# Patient Record
Sex: FEMALE | Race: WHITE | NOT HISPANIC OR LATINO | Employment: FULL TIME | URBAN - METROPOLITAN AREA
[De-identification: names, ages, dates, MRNs, and addresses within clinical notes are randomized per-mention and may not be internally consistent; named-entity substitution may affect disease eponyms.]

---

## 2017-04-04 ENCOUNTER — COMMUNICATION - HEALTHEAST (OUTPATIENT)
Dept: FAMILY MEDICINE | Facility: CLINIC | Age: 57
End: 2017-04-04

## 2017-04-11 ENCOUNTER — OFFICE VISIT - HEALTHEAST (OUTPATIENT)
Dept: FAMILY MEDICINE | Facility: CLINIC | Age: 57
End: 2017-04-11

## 2017-04-11 DIAGNOSIS — F41.1 GENERALIZED ANXIETY DISORDER: ICD-10-CM

## 2017-04-11 DIAGNOSIS — Z11.1 SCREENING-PULMONARY TB: ICD-10-CM

## 2017-04-13 ENCOUNTER — AMBULATORY - HEALTHEAST (OUTPATIENT)
Dept: NURSING | Facility: CLINIC | Age: 57
End: 2017-04-13

## 2017-04-13 DIAGNOSIS — Z00.00 HEALTH CARE MAINTENANCE: ICD-10-CM

## 2017-05-19 ENCOUNTER — COMMUNICATION - HEALTHEAST (OUTPATIENT)
Dept: FAMILY MEDICINE | Facility: CLINIC | Age: 57
End: 2017-05-19

## 2017-05-19 DIAGNOSIS — F41.1 GENERALIZED ANXIETY DISORDER: ICD-10-CM

## 2017-06-12 ENCOUNTER — OFFICE VISIT - HEALTHEAST (OUTPATIENT)
Dept: FAMILY MEDICINE | Facility: CLINIC | Age: 57
End: 2017-06-12

## 2017-06-12 DIAGNOSIS — J02.9 SORE THROAT: ICD-10-CM

## 2017-06-12 DIAGNOSIS — J02.0 STREP PHARYNGITIS: ICD-10-CM

## 2017-06-12 DIAGNOSIS — R05.9 COUGH: ICD-10-CM

## 2017-06-13 ENCOUNTER — COMMUNICATION - HEALTHEAST (OUTPATIENT)
Dept: SCHEDULING | Facility: CLINIC | Age: 57
End: 2017-06-13

## 2017-06-13 ENCOUNTER — RECORDS - HEALTHEAST (OUTPATIENT)
Dept: ADMINISTRATIVE | Facility: OTHER | Age: 57
End: 2017-06-13

## 2017-07-21 ENCOUNTER — COMMUNICATION - HEALTHEAST (OUTPATIENT)
Dept: FAMILY MEDICINE | Facility: CLINIC | Age: 57
End: 2017-07-21

## 2017-07-21 DIAGNOSIS — F41.1 GENERALIZED ANXIETY DISORDER: ICD-10-CM

## 2017-12-04 ENCOUNTER — COMMUNICATION - HEALTHEAST (OUTPATIENT)
Dept: FAMILY MEDICINE | Facility: CLINIC | Age: 57
End: 2017-12-04

## 2017-12-04 DIAGNOSIS — F41.1 GENERALIZED ANXIETY DISORDER: ICD-10-CM

## 2018-01-30 ENCOUNTER — COMMUNICATION - HEALTHEAST (OUTPATIENT)
Dept: FAMILY MEDICINE | Facility: CLINIC | Age: 58
End: 2018-01-30

## 2018-01-30 DIAGNOSIS — F41.1 GENERALIZED ANXIETY DISORDER: ICD-10-CM

## 2018-01-30 DIAGNOSIS — Z11.1 SCREENING-PULMONARY TB: ICD-10-CM

## 2018-01-31 ENCOUNTER — AMBULATORY - HEALTHEAST (OUTPATIENT)
Dept: NURSING | Facility: CLINIC | Age: 58
End: 2018-01-31

## 2018-02-02 ENCOUNTER — AMBULATORY - HEALTHEAST (OUTPATIENT)
Dept: NURSING | Facility: CLINIC | Age: 58
End: 2018-02-02

## 2018-02-02 DIAGNOSIS — Z11.1 ENCOUNTER FOR PPD SKIN TEST READING: ICD-10-CM

## 2018-02-02 LAB
INDURATION - HISTORICAL: 0 MM
TB SKIN TEST - HISTORICAL: NEGATIVE

## 2018-02-07 ENCOUNTER — COMMUNICATION - HEALTHEAST (OUTPATIENT)
Dept: FAMILY MEDICINE | Facility: CLINIC | Age: 58
End: 2018-02-07

## 2018-03-20 ENCOUNTER — COMMUNICATION - HEALTHEAST (OUTPATIENT)
Dept: FAMILY MEDICINE | Facility: CLINIC | Age: 58
End: 2018-03-20

## 2018-03-20 DIAGNOSIS — F41.1 GENERALIZED ANXIETY DISORDER: ICD-10-CM

## 2018-05-15 ENCOUNTER — COMMUNICATION - HEALTHEAST (OUTPATIENT)
Dept: FAMILY MEDICINE | Facility: CLINIC | Age: 58
End: 2018-05-15

## 2018-05-15 DIAGNOSIS — F41.1 GENERALIZED ANXIETY DISORDER: ICD-10-CM

## 2018-05-16 ENCOUNTER — COMMUNICATION - HEALTHEAST (OUTPATIENT)
Dept: FAMILY MEDICINE | Facility: CLINIC | Age: 58
End: 2018-05-16

## 2018-05-16 DIAGNOSIS — F41.1 GENERALIZED ANXIETY DISORDER: ICD-10-CM

## 2018-06-05 ENCOUNTER — OFFICE VISIT - HEALTHEAST (OUTPATIENT)
Dept: FAMILY MEDICINE | Facility: CLINIC | Age: 58
End: 2018-06-05

## 2018-06-05 DIAGNOSIS — Z79.899 CONTROLLED SUBSTANCE AGREEMENT SIGNED: ICD-10-CM

## 2018-06-05 DIAGNOSIS — F41.9 ANXIETY: ICD-10-CM

## 2018-06-05 DIAGNOSIS — Z12.11 COLON CANCER SCREENING: ICD-10-CM

## 2018-07-04 ENCOUNTER — RECORDS - HEALTHEAST (OUTPATIENT)
Dept: ADMINISTRATIVE | Facility: OTHER | Age: 58
End: 2018-07-04

## 2020-01-06 ENCOUNTER — OFFICE VISIT - HEALTHEAST (OUTPATIENT)
Dept: FAMILY MEDICINE | Facility: CLINIC | Age: 60
End: 2020-01-06

## 2020-01-06 DIAGNOSIS — J01.80 OTHER SUBACUTE SINUSITIS: ICD-10-CM

## 2020-01-06 DIAGNOSIS — Z12.31 VISIT FOR SCREENING MAMMOGRAM: ICD-10-CM

## 2020-01-06 DIAGNOSIS — H93.8X3 EAR PRESSURE, BILATERAL: ICD-10-CM

## 2020-01-06 DIAGNOSIS — R42 DIZZINESS: ICD-10-CM

## 2020-01-06 DIAGNOSIS — Z12.11 SPECIAL SCREENING FOR MALIGNANT NEOPLASMS, COLON: ICD-10-CM

## 2020-01-08 ENCOUNTER — COMMUNICATION - HEALTHEAST (OUTPATIENT)
Dept: SCHEDULING | Facility: CLINIC | Age: 60
End: 2020-01-08

## 2020-01-22 ENCOUNTER — COMMUNICATION - HEALTHEAST (OUTPATIENT)
Dept: FAMILY MEDICINE | Facility: CLINIC | Age: 60
End: 2020-01-22

## 2020-01-31 ENCOUNTER — RECORDS - HEALTHEAST (OUTPATIENT)
Dept: ADMINISTRATIVE | Facility: OTHER | Age: 60
End: 2020-01-31

## 2020-01-31 LAB — COLOGUARD-ABSTRACT: NEGATIVE

## 2020-02-07 ENCOUNTER — OFFICE VISIT - HEALTHEAST (OUTPATIENT)
Dept: FAMILY MEDICINE | Facility: CLINIC | Age: 60
End: 2020-02-07

## 2020-02-07 DIAGNOSIS — H69.93 DYSFUNCTION OF BOTH EUSTACHIAN TUBES: ICD-10-CM

## 2020-02-13 ENCOUNTER — COMMUNICATION - HEALTHEAST (OUTPATIENT)
Dept: FAMILY MEDICINE | Facility: CLINIC | Age: 60
End: 2020-02-13

## 2020-02-14 ENCOUNTER — RECORDS - HEALTHEAST (OUTPATIENT)
Dept: HEALTH INFORMATION MANAGEMENT | Facility: CLINIC | Age: 60
End: 2020-02-14

## 2020-03-10 ENCOUNTER — COMMUNICATION - HEALTHEAST (OUTPATIENT)
Dept: FAMILY MEDICINE | Facility: CLINIC | Age: 60
End: 2020-03-10

## 2020-03-10 DIAGNOSIS — H93.13 TINNITUS, BILATERAL: ICD-10-CM

## 2020-03-10 DIAGNOSIS — H69.93 DYSFUNCTION OF BOTH EUSTACHIAN TUBES: ICD-10-CM

## 2020-05-04 ENCOUNTER — OFFICE VISIT - HEALTHEAST (OUTPATIENT)
Dept: FAMILY MEDICINE | Facility: CLINIC | Age: 60
End: 2020-05-04

## 2020-05-04 DIAGNOSIS — H65.23 BILATERAL CHRONIC SEROUS OTITIS MEDIA: ICD-10-CM

## 2020-05-04 RX ORDER — CETIRIZINE HYDROCHLORIDE 10 MG/1
10 TABLET ORAL DAILY
Status: SHIPPED | COMMUNITY
Start: 2020-05-04

## 2020-05-04 RX ORDER — PSEUDOEPHEDRINE HCL 30 MG
30 TABLET ORAL 2 TIMES DAILY
Status: SHIPPED | COMMUNITY
Start: 2020-05-04

## 2020-08-17 ENCOUNTER — OFFICE VISIT - HEALTHEAST (OUTPATIENT)
Dept: FAMILY MEDICINE | Facility: CLINIC | Age: 60
End: 2020-08-17

## 2020-08-17 ENCOUNTER — COMMUNICATION - HEALTHEAST (OUTPATIENT)
Dept: SCHEDULING | Facility: CLINIC | Age: 60
End: 2020-08-17

## 2020-08-17 DIAGNOSIS — F41.1 GENERALIZED ANXIETY DISORDER: ICD-10-CM

## 2020-08-17 RX ORDER — HYDROXYZINE PAMOATE 25 MG/1
25 CAPSULE ORAL 3 TIMES DAILY PRN
Qty: 30 CAPSULE | Refills: 0 | Status: SHIPPED | OUTPATIENT
Start: 2020-08-17

## 2020-08-17 ASSESSMENT — ANXIETY QUESTIONNAIRES
IF YOU CHECKED OFF ANY PROBLEMS ON THIS QUESTIONNAIRE, HOW DIFFICULT HAVE THESE PROBLEMS MADE IT FOR YOU TO DO YOUR WORK, TAKE CARE OF THINGS AT HOME, OR GET ALONG WITH OTHER PEOPLE: SOMEWHAT DIFFICULT
GAD7 TOTAL SCORE: 11
5. BEING SO RESTLESS THAT IT IS HARD TO SIT STILL: SEVERAL DAYS
6. BECOMING EASILY ANNOYED OR IRRITABLE: NOT AT ALL
7. FEELING AFRAID AS IF SOMETHING AWFUL MIGHT HAPPEN: NEARLY EVERY DAY
2. NOT BEING ABLE TO STOP OR CONTROL WORRYING: SEVERAL DAYS
3. WORRYING TOO MUCH ABOUT DIFFERENT THINGS: MORE THAN HALF THE DAYS
4. TROUBLE RELAXING: MORE THAN HALF THE DAYS
1. FEELING NERVOUS, ANXIOUS, OR ON EDGE: MORE THAN HALF THE DAYS

## 2020-08-17 ASSESSMENT — PATIENT HEALTH QUESTIONNAIRE - PHQ9: SUM OF ALL RESPONSES TO PHQ QUESTIONS 1-9: 3

## 2020-10-07 ENCOUNTER — COMMUNICATION - HEALTHEAST (OUTPATIENT)
Dept: SCHEDULING | Facility: CLINIC | Age: 60
End: 2020-10-07

## 2020-10-07 ENCOUNTER — OFFICE VISIT - HEALTHEAST (OUTPATIENT)
Dept: FAMILY MEDICINE | Facility: CLINIC | Age: 60
End: 2020-10-07

## 2020-10-07 DIAGNOSIS — J01.90 ACUTE NON-RECURRENT SINUSITIS, UNSPECIFIED LOCATION: ICD-10-CM

## 2020-10-07 DIAGNOSIS — Z20.822 SUSPECTED COVID-19 VIRUS INFECTION: ICD-10-CM

## 2020-11-04 ENCOUNTER — AMBULATORY - HEALTHEAST (OUTPATIENT)
Dept: FAMILY MEDICINE | Facility: CLINIC | Age: 60
End: 2020-11-04

## 2020-11-04 DIAGNOSIS — Z20.822 SUSPECTED COVID-19 VIRUS INFECTION: ICD-10-CM

## 2020-11-06 ENCOUNTER — COMMUNICATION - HEALTHEAST (OUTPATIENT)
Dept: SCHEDULING | Facility: CLINIC | Age: 60
End: 2020-11-06

## 2021-05-27 ENCOUNTER — RECORDS - HEALTHEAST (OUTPATIENT)
Dept: ADMINISTRATIVE | Facility: CLINIC | Age: 61
End: 2021-05-27

## 2021-05-27 ASSESSMENT — PATIENT HEALTH QUESTIONNAIRE - PHQ9: SUM OF ALL RESPONSES TO PHQ QUESTIONS 1-9: 3

## 2021-05-28 ASSESSMENT — ANXIETY QUESTIONNAIRES: GAD7 TOTAL SCORE: 11

## 2021-06-01 VITALS — HEIGHT: 65 IN

## 2021-06-04 VITALS
HEART RATE: 74 BPM | RESPIRATION RATE: 16 BRPM | DIASTOLIC BLOOD PRESSURE: 92 MMHG | SYSTOLIC BLOOD PRESSURE: 144 MMHG | OXYGEN SATURATION: 100 % | TEMPERATURE: 98.6 F

## 2021-06-04 VITALS
SYSTOLIC BLOOD PRESSURE: 120 MMHG | HEART RATE: 76 BPM | TEMPERATURE: 98.7 F | OXYGEN SATURATION: 99 % | DIASTOLIC BLOOD PRESSURE: 84 MMHG

## 2021-06-05 NOTE — PROGRESS NOTES
Assessment:     1. Other subacute sinusitis  fluticasone propionate (FLONASE) 50 mcg/actuation nasal spray    loratadine (CLARITIN) 10 mg tablet    amoxicillin (AMOXIL) 500 MG tablet   2. Visit for screening mammogram  Mammo Screening Bilateral   3. Special screening for malignant neoplasms, colon  Cologuard   4. Dizziness     5. Ear pressure, bilateral       Symptoms with sinusitis and eustachian tube dysfunction.  Discussed with patient that these need to be managed and at this time antibiotics are not really needed but if she continues to worsen then she can start taking it.    I have reviewed that she has not had routine health maintenance for some time.  Reviewed her chart from [facility] and in the past she is also had some prediabetes with a hemoglobin A1c of 6.0.  This was in 2013.  Unable to find a recent Pap.  Last one is from 2013.  Advised patient to RTC for lab work and health maintenance.    Offered testing baseline CBC and hemoglobin A1c today that she declines.    Plan:      The diagnosis was discussed with the patient and evaluation and treatment plans outlined.  Follow up: Return for Annual physical.     Subjective:      Elijah Jorgensen is a  female who presents for evaluation of   Chief Complaint   Patient presents with     Dizziness     Pt here today to evaluate dizziness, bilateral ear pain/ fullness/ ringing, nausea, HA's, x 1 mo      Onset was 1 month ago. Symptoms have been recurring. The pain is described as pressure-like, and is Mild to moderate/10 in intensity. Pain is located in the both ears with radiation to none.  Aggravating factors: none.  Alleviating factors: Sudafed. Associated symptoms: headache and nasal and sinus congestion. The patient denies anorexia, chills, fever, nausea and vomiting.  Patient reports that her symptoms started about 1 month ago.  She was seen in an emergency room.  She was told to start symptom measures.  Sudafed helped.  She thought she was  getting better and then discontinued Sudafed and feels like she had a rebound of the symptoms.    The following portions of the patient's history were reviewed and updated as appropriate: allergies, current medications, past family history, past medical history, past social history, past surgical history and problem list.  Allergies   Allergen Reactions     Erythromycin Base      Sulfa (Sulfonamide Antibiotics)        Current Outpatient Medications on File Prior to Visit   Medication Sig Dispense Refill     HEMP OIL OR EXTRACT OR OTHER CBD CANNABINOID, NOT MEDICAL CANNABIS, CBD oil- takes prn       [DISCONTINUED] ALPRAZolam (XANAX) 0.25 MG tablet Take 1 tablet (0.25 mg total) by mouth 3 (three) times a day as needed for anxiety. 30 tablet 0     [DISCONTINUED] sertraline (ZOLOFT) 25 MG tablet Take 1 tablet (25 mg total) by mouth daily. 30 tablet 2     No current facility-administered medications on file prior to visit.        Patient Active Problem List   Diagnosis     Generalized Anxiety Disorder       History reviewed. No pertinent past medical history.    Past Surgical History:   Procedure Laterality Date     [place] REPAIR UMBILICAL CHRIS,<4Y/O,REDUC      Description: Umbilical Hernia Repair;  Recorded: 06/19/2013;       History reviewed. No pertinent family history.    Social History     Socioeconomic History     Marital status:      Spouse name: None     Number of children: None     Years of education: None     Highest education level: None   Occupational History     None   Social Needs     Financial resource strain: None     Food insecurity:     Worry: None     Inability: None     Transportation needs:     Medical: None     Non-medical: None   Tobacco Use     Smoking status: Never Smoker     Smokeless tobacco: Never Used   Substance and Sexual Activity     Alcohol use: None     Drug use: None     Sexual activity: None   Lifestyle     Physical activity:     Days per week: None     Minutes per session: None      Stress: None   Relationships     Social connections:     Talks on phone: None     Gets together: None     Attends Christian service: None     Active member of club or organization: None     Attends meetings of clubs or organizations: None     Relationship status: None     Intimate partner violence:     Fear of current or ex partner: None     Emotionally abused: None     Physically abused: None     Forced sexual activity: None   Other Topics Concern     None   Social History Narrative    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        Enrique Tipton MD  1/6/2020           Review of Systems  Constitutional: negative  Cardiovascular: negative  Gastrointestinal: negative  Hematologic/lymphatic: negative       Objective:     BP (!) 144/92 (Patient Site: Left Arm, Patient Position: Sitting, Cuff Size: Adult Large)   Pulse 74   Temp 98.6  F (37  C) (Oral)   Resp 16   SpO2 100%       General:Healthy, alert and in no acute distress  Head:  NCAT w/o lesions or tenderness  Eyes: conjunctivae/corneas clear. PERRL, EOM's intact. Fundi benign  Ears: normal TM's and external ear canals bilateral  Sinus tender: negative  Nose: Enlarged and erythematous turbinates  Mouth: lips, mucosa, and tongue normal. Teeth and gums normal. No tonsillar endangerment,  Mild erythema of pharynx  Neck: supple, symmetrical, trachea midline.  Lungs: clear to auscultation bilaterally  Heart: RRR, No murmurs  Abdomen: Soft NTND, No HSM  Skin: No rashes  Neurological exam: gait normal, alert and oriented X 3, reflexes active and equal, R handed, speech normal, mental status intact, muscle tone normal, muscle strength normal,  Bronson-Hallpike test is normal/negative.

## 2021-06-05 NOTE — PATIENT INSTRUCTIONS - HE
On exam you have clear fluid behind the ear drum - no infection - we call this eustachian tube dysfunction  To treat this:  -start daily flonase for at least 4 weeks  -start daily antihistamine - zyrtec (start with this, take in the morning once per day) or allegra (2nd option)    If not improving after doing this for 4 weeks or if worsening then I would consider referral to the ENT (ear specialist)    [facility] [phone]

## 2021-06-05 NOTE — PATIENT INSTRUCTIONS - HE
Patient Education   Patient Education     Understanding Acute Rhinosinusitis    Acute rhinosinusitis is when the lining of the inside of the nose and the sinuses becomes irritated and swollen. It is also called sinusitis, or a sinus infection.  Sinuses are air-filled spaces in the skull behind the face. They are kept moist and clean by a lining of mucosa. Things such as pollen, smoke, and chemical fumes can irritate the mucosa. It can then swell up. As a response to irritation, the mucosa makes more mucus and other fluids. Tiny hairlike cilia cover the mucosa. Cilia help carry mucus toward the opening of the sinus. Too much mucus may cause the cilia to stop working. This blocks the sinus opening. A buildup of fluid in the sinuses then causes pain and pressure. It can also cause bacteria to grow in the sinuses.  What causes acute rhinosinusitis?  A sinus infection is most often caused by a virus. You are more likely to get one after having a cold or the flu. In some cases, a sinus infection can be caused by bacteria.  You are at higher risk for a sinus infection if you:    Are older in age    Have structural problems with your sinuses    Smoke or are exposed to secondhand smoke    Are exposed to changes in pressure, such as from flying a lot or deep sea diving    Have asthma or allergies    Have a weak immune system    Have dental disease     Symptoms of acute rhinosinusitis  Symptoms of acute rhinosinusitis often last around 7 to 10 days. If you have a bacterial infection, they may last longer. They may also get better but then worsen. You may have:    Face pain or pressure under the eyes and around the nose    Headache    Fluid draining in the back of the throat (postnasal drip)    Congestion    Drainage that is thick and colored (often green), instead of clear    Cough    Problems with your sense of smell    Ear pain or hearing problems    Fever    Tooth pain    Fatigue  Diagnosing acute  rhinosinusitis  Your healthcare provider will ask about your symptoms and past health. He or she will look at your ears, nose, throat, and sinuses. Imaging tests, such as X-rays, are often not needed.  It can be hard to figure out if a sinus infection is caused by a virus or bacterium. A bacterial infection tends to last longer. Symptoms may also get better but then worsen. Your healthcare provider may take a sample of mucus from your nose to check for bacteria.  Treating acute rhinosinusitis  Most sinus infections will go away within 10 days. Your body will fight off the virus. If your symptoms seem to get better but then worsen, you may have a bacterial infection instead. Your healthcare provider will then give you antibiotics. Take this medicine until it is gone, even if you feel better.  To help ease your symptoms, your healthcare provider may advise:    Over-the-counter pain relievers. Medicines such as acetaminophen or ibuprofen can ease sinus pain. They may also lower a fever.    Nasal washes. Washing your nasal passages with salt water may ease pain and pressure. It can rinse out mucous and other irritants from your sinuses. Your healthcare provider can show you how to do it.    Nasal steroid spray. This prescription medicine can reduce inflammation in your sinuses.    Other medicines. Decongestants, antihistamines, and other nasal sprays may give short-term relief. They may help with congestion. Talk with your healthcare provider before taking these medicines.     Preventing acute rhinosinusitis  You can help prevent a sinus infection with these steps:    Wash your hands well and often.    Stay away from people who have a cold or upper respiratory infection.    Don't smoke. And stay away from secondhand smoke.    Use a humidifier at home.    Make sure you are up-to-date on your vaccines, such as the flu shot.     When to call your healthcare provider  Call your healthcare provider right away if you have any  of these:    Fever of 100.4 F (38 C) or higher, or as directed by your healthcare provider    Pain that gets worse    Symptoms that don t get better, or get worse    New symptoms  Date Last Reviewed: 6/1/2019 2000-2019 [employer]. [address] [place] [id number]. All rights reserved. This information is not intended as a substitute for professional medical care. Always follow your healthcare professional's instructions.           Causes of Sinusitis  Mucus helps keep your sinuses clean. But mucus may build up in the sinuses because of colds, allergies, or blockages. These things get in the way of the natural drainage of mucus. This may lead to sinusitis. Sinusitis means sinus inflammation and infection.    Acute sinusitis comes on suddenly. It often happens right after an upper respiratory infection, such as a cold. Viruses cause most acute sinus infections.    Chronic sinusitis is ongoing swelling of the sinus lining. Doctors don't know what causes chronic sinusitis.      Colds and other infections  A cold or flu may cause your sinus and nasal linings to swell. Sinus openings can become blocked. This causes mucus to back up. This backed-up mucus becomes an ideal place for bacteria to grow. Thick, yellow, or discolored mucus is one sign of infection.  Allergic reactions  You may be sensitive to certain substances. This causes the release of histamine in the body. Histamine makes your sinus and nasal linings swell. Long-term swelling clogs your sinuses. It prevents the tiny hairs (cilia) in the nasal lining from sweeping away mucus. Allergy symptoms can continue over time. But they re less severe than with colds.  Blockages    A polyp is a sac of swollen tissue. It can be the result of an allergy or infection. It may block the opening where most of your sinuses drain (middle meatus). It may even grow large enough to block your nose.    A deviated septum is when the thin wall inside your  nose is pushed to one side. It's often the result of injury. This can block your middle meatus.  People with chronic nasal problems or allergies are more likely to get acute sinusitis. Sinusitis is also more common if you have a weakened immune system, such as with HIV. You are also more likely to get sinusitis if you have cystic fibrosis or another condition that causes your body to make extra mucus.  Date Last Reviewed: 7/1/2019 2000-2019 [employer]. [address] [place] [address], [place] [address]. All rights reserved. This information is not intended as a substitute for professional medical care. Always follow your healthcare professional's instructions.

## 2021-06-05 NOTE — TELEPHONE ENCOUNTER
RN Triage:    Seen in clinic 2 d ago for congestion in ears.  Was diagnosed with sinusitis and prescribed flonase and claritin.  Was also prescribed an antibiotic to begin if symptoms worsened.  No sinus pain, pressure or headache.  No nasal discharge.  Has been taking zyrtec instead of claritin.  Has not started taking the antibiotic.    Calling today because she began feeling more fatigued yesterday and has had one episode of diarrhea today.  Has not taken her temperature.    Home care for diarrhea discussed.  She plans to continue to hold off on the antibiotic for now, since her sinus symptoms are not worse.  Will call back if symptoms persist or worsen.    Marina Patterson, RN  [facility]    Reason for Disposition    MILD-MODERATE diarrhea (e.g., 1-6 times / day more than normal)    Protocols used: DIARRHEA-A-OH

## 2021-06-05 NOTE — PROGRESS NOTES
"Assessment/Plan:    1. Dysfunction of both eustachian tubes  Evidence of eustachian tube dysfunction on exam which is likely cause of symptoms - no evidence of infection or serious pathology on exam. Recommend treatment with the following: start daily flonase, start daily antihistamine zyrtec or allegra. Discussed if symptoms do not resolve after 4 weeks could consider referral to ENT for further evaluation and management. Pt agrees with plan.      Follow up: 4 weeks for recheck if needed    Melinda Chang MD  [facility]    Subjective:    Patient ID: Elijah Jorgensen is a 59 y.o. female is here today for ear concerns    Ear concerns  -seen on 12/6/19 at [facility] ER/UR - dx with eustachian tube dysfunction on left and URI - recommended to take sudafed and nasal spray  -symptoms worsened/weren't improving so went back for recheck  -seen on 1/6 at another clinic - diagnosed with sinus infection and eustachian tube dysfunc (flonase, amox, claritin)  -hears a \"hissing\" sound in her left ear  -like \"when you are on an airplane and it feels like sounds are switching\"  -goes to chiropractor  -symptoms worse when exerting self   -wants to make sure ears are looking ok  -no fevers  -some nasal congestion - states probably has allergies  -stopped flonase - took zyrtec instead but not taking any longer  -took amoxicillin 8/10 days - missed 2 days  -reports hx sinus infections  -hard to say if hearing is worse than normal  -no further vertigo/dizziness  -roommate/fiance has been sick with similar sx but now better      Patient Active Problem List   Diagnosis     Generalized Anxiety Disorder     Past Surgical History:   Procedure Laterality Date     [place] REPAIR UMBILICAL CHRIS,<4Y/O,REDUC      Description: Umbilical Hernia Repair;  Recorded: 06/19/2013;     Current Outpatient Medications on File Prior to Visit   Medication Sig Dispense Refill     fluticasone propionate (FLONASE) 50 mcg/actuation nasal spray 2 sprays " into each nostril daily. 16 g 12     HEMP OIL OR EXTRACT OR OTHER CBD CANNABINOID, NOT MEDICAL CANNABIS, CBD oil- takes prn       loratadine (CLARITIN) 10 mg tablet Take 1 tablet (10 mg total) by mouth daily. 30 tablet 2     No current facility-administered medications on file prior to visit.      Allergies   Allergen Reactions     Erythromycin Base      Sulfa (Sulfonamide Antibiotics)      Social History     Socioeconomic History     Marital status:      Spouse name: Not on file     Number of children: Not on file     Years of education: Not on file     Highest education level: Not on file   Occupational History     Not on file   Social Needs     Financial resource strain: Not on file     Food insecurity:     Worry: Not on file     Inability: Not on file     Transportation needs:     Medical: Not on file     Non-medical: Not on file   Tobacco Use     Smoking status: Never Smoker     Smokeless tobacco: Never Used   Substance and Sexual Activity     Alcohol use: Not on file     Drug use: Not on file     Sexual activity: Not on file   Lifestyle     Physical activity:     Days per week: Not on file     Minutes per session: Not on file     Stress: Not on file   Relationships     Social connections:     Talks on phone: Not on file     Gets together: Not on file     Attends Catholic service: Not on file     Active member of club or organization: Not on file     Attends meetings of clubs or organizations: Not on file     Relationship status: Not on file     Intimate partner violence:     Fear of current or ex partner: Not on file     Emotionally abused: Not on file     Physically abused: Not on file     Forced sexual activity: Not on file   Other Topics Concern     Not on file   Social History Narrative    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        Enrique Tipton MD  1/6/2020         History reviewed. No pertinent family  history.  Review of systems is as stated in HPI, and the remainder of system review is otherwise negative.    Objective:      /84   Pulse 76   Temp 98.7  F (37.1  C)   SpO2 99%     General appearance: awake, NAD  HEENT: atraumatic, normocephalic, PERRL, no scleral icterus or injection, bilateral eustachian tube dysfunction but no evidence of infection, no sinus tenderness, no significant rhinorrhea, no erythema of posterior oropharynx, moist mucous membranes  Neck: supple, no lymphadenopathy, normal ROM  CV: RRR, no murmurs/rubs/gallops, normal S1 and S2  Lungs: CTAB, no wheezes or crackles, breathing comfortably on room air  Extremities: no LE edema bilaterally, moving all extremities  Skin: no rashes or lesions  Neuro: alert, oriented x3, CNs grossly intact, no focal deficits appreciated  Psych: normal mood/affect/behavior, answering questions appropriately, linear thought process

## 2021-06-06 NOTE — TELEPHONE ENCOUNTER
Referral Request  Type of referral: ENT/ Audiology   Who s requesting: Patient  Why the request: Ear plugged, ringing and hissing sound x3 months   Have you been seen for this request: Yes  Does patient have a preference on a group/provider? No   Okay to leave a detailed message?  Yes

## 2021-06-06 NOTE — TELEPHONE ENCOUNTER
----- Message from Lizzy Zendejas MD sent at 2/13/2020 12:22 PM CST -----  Please contact patient with results:  Cologuard test is negative/normal  Lizzy Zendejas MD  Family Medicine  [facility]

## 2021-06-06 NOTE — TELEPHONE ENCOUNTER
Order for ENT pended for approval    Per 2/7/2020 OV note:   1. Dysfunction of both eustachian tubes  Evidence of eustachian tube dysfunction on exam which is likely cause of symptoms - no evidence of infection or serious pathology on exam. Recommend treatment with the following: start daily flonase, start daily antihistamine zyrtec or allegra. Discussed if symptoms do not resolve after 4 weeks could consider referral to ENT for further evaluation and management. Pt agrees with plan.        Follow up: 4 weeks for recheck if needed

## 2021-06-06 NOTE — TELEPHONE ENCOUNTER
Patient Returning Call  Reason for call:  Return call   Information relayed to patient:  Caller was informed of message below.  Patient has additional questions:  No  If YES, what are your questions/concerns:  n/a  Okay to leave a detailed message?: No call back needed

## 2021-06-06 NOTE — TELEPHONE ENCOUNTER
Left message to call back for: cologuard results  Information to relay to patient:  See note from provider below.

## 2021-06-07 NOTE — PROGRESS NOTES
"Elijah Jorgensen is a 59 y.o. female who is being evaluated via a billable video visit.      The patient has been notified of following:     \"This video visit will be conducted via a call between you and your physician/provider. We have found that certain health care needs can be provided without the need for an in-person physical exam.  This service lets us provide the care you need with a video conversation.  If a prescription is necessary we can send it directly to your pharmacy.  If lab work is needed we can place an order for that and you can then stop by our lab to have the test done at a later time.    Video visits are billed at different rates depending on your insurance coverage. Please reach out to your insurance provider with any questions.    If during the course of the call the physician/provider feels a video visit is not appropriate, you will not be charged for this service.\"    Patient has given verbal consent to a Video visit? Yes    Patient would like to receive their AVS by AVS Preference: Krysten.    Patient would like the video invitation sent by: Text to cell phone: [phone]    Will anyone else be joining your video visit? No        Video Start Time: 11:43 AM    Additional provider notes:     Assessment/Plan:      Bilateral chronic serous otitis media  Discussed at length with patient today options for this issue.  Since she has tried and failed a course of amoxicillin, nasal sprays, antihistamines and decongestants, discussed that we can try combination of oral steroids and antibiotics.  Patient is willing to try this.  She is very nervous about becoming jittery on steroids.  Discussed that she can cut back to 20 mg if she feels overly jittery on 40 mg.  She will take this in the morning with food.  If no improvement with antibiotics and steroids, she will let us know at which point if she is having severe symptoms we can reach out to ENT to see if they would see her for this.  - " "predniSONE (DELTASONE) 20 MG tablet; Take 40 mg by mouth daily for 5 days.  Dispense: 10 tablet; Refill: 0  - cefdinir (OMNICEF) 300 MG capsule; Take 1 capsule (300 mg total) by mouth 2 (two) times a day for 10 days.  Dispense: 20 capsule; Refill: 0        Subjective:       Elijah Jorgensen is a 59 y.o. female who presents for further discussion of bilateral ear plugging and intermittent pain.  Patient has had this issue since December in both ears.  She felt a bit better after a course of amoxicillin that she took in January, but symptoms fairly quickly returned thereafter.  She has been using Flonase and Claritin.  She also started taking Sudafed.  Initially she was taking this every 6 hours, then has decreased to twice daily.  She denies that Sudafed keeps her up at night.  She has been taking 60 mg twice daily.  She describes intermittent pain in either ear, sometimes worse on the right.  She also describes increasing nasal congestion over the past few days along with a \"tinny\" sound in both ears.  She will describe a hissing sound intermittently as well.  She feels mild pressure around her nose and possibly in her forehead.  She denies nasal drainage.  She also denies fevers.  She denies drainage from the ears.    The following portions of the patient's history were reviewed and updated as appropriate: allergies, current medications, past medical history, past social history and problem list.      Current Outpatient Medications:      cetirizine (ZYRTEC) 10 MG tablet, Take 10 mg by mouth daily., Disp: , Rfl:      HEMP OIL OR EXTRACT OR OTHER CBD CANNABINOID, NOT MEDICAL CANNABIS,, CBD oil- takes prn, Disp: , Rfl:      pseudoephedrine (SUDAFED) 30 MG tablet, Take 30 mg by mouth 2 (two) times a day., Disp: , Rfl:      fluticasone propionate (FLONASE) 50 mcg/actuation nasal spray, 2 sprays into each nostril daily., Disp: 16 g, Rfl: 12    Review of Systems   Pertinent items are noted in HPI.      Objective:      " Breastfeeding No     General appearance: alert, appears stated age and cooperative  Head: Normocephalic, without obvious abnormality, atraumatic  Eyes: Conjunctivae clear  Ears: External ears normal without swelling, no drainage  Nose: no discharge, mild congestion  Neurologic: Grossly normal, alert and oriented x3, good historian              Video-Visit Details    Type of service:  Video Visit    Video End Time (time video stopped): 12:02 PM  Originating Location (pt. Location): Home    Distant Location (provider location):  [facility]     Platform used for Video Visit: Kellie Zafar MD

## 2021-06-10 NOTE — PROGRESS NOTES
Assessment & Plan:  1. Generalized anxiety disorder  Restart xanax, use sparingly. Discussed possible need for CSA if needing on a regular basis. Patient in agreement with plan. Recommend follow up for mood every 6 months. Schedule annual exam in the coming months.  - ALPRAZolam (XANAX) 0.25 MG tablet; Take 1 tablet (0.25 mg total) by mouth 3 (three) times a day as needed for anxiety.  Dispense: 30 tablet; Refill: 0    2. Screening-pulmonary TB  Skin test placed by nursing, come back for read in 48-72 hours and we will provide documentation.   - TB Skin Test      There are no Patient Instructions on file for this visit.    Orders Placed This Encounter   Procedures     TB Skin Test     There are no discontinued medications.        The following are part of a depression follow up plan for the patient:  emotional support education    Chief Complaint:   Chief Complaint   Patient presents with     mantoux placement     Establish Care       History of Present Illness:  Elijah is a 56 y.o. female presenting to the clinic today for mental screening for a new job.  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  Secondly she is here to establish care, she is generally quite healthy individual with the exception of history of anxiety.  Her   about 3 years ago and she has been dealing with intermittent anxiety since that time.  She is wondering today if we can discuss medication use for this, she does not desire to take daily medication but has used Xanax quite sparingly in the past and this has helped her. She has used daily medication previously and did not like how it made her feel, she did not find it to be helpful enough to tolerate side effects.  She is not interested in therapy at this time.  She has tried in the past and does not wish to pursue at this time. She had a good support system with family and friends. She works as a hairdresser. She mentions  that use this time of years especially difficult given that the anniversary of death is coming up and it was also recently birthday.  She is hoping to have a few tablets of Xanax to help her with the situational problem.     Review of Systems:  All other systems are negative except as noted above.    PFSH:  Works as a hairdresser, single, currently working towards another job as a PCA.    Tobacco Use:  History   Smoking Status     Never Smoker   Smokeless Tobacco     Not on file       Vitals:  Vitals:    04/11/17 1126   BP: 120/84   Patient Site: Left Arm   Patient Position: Sitting   Cuff Size: Adult Regular   Pulse: 78   Resp: 16     Wt Readings from Last 3 Encounters:   No data found for Wt       Physical Exam:  Constitutional:  Reveals an alert, cooperative, 56 year old female in no acute distress.  Vitals:  Per nursing notes.  Cardiovascular:  Regular rate and rhythm without murmurs, rubs, or gallops. Carotids without bruits. Legs without edema.   Respiratory: Clear.  Respiratory effort normal.  Psychiatric:  Mood appropriate, memory intact.     Data Reviewed:  Additional History from Old Records or Another Person Summarized (2 total): None.     Decision to Obtain Extra information (1 total): None.     Radiology Tests Summarized and Ordered (XRAY/CT/MRI/DXA) (1 total): None.    Labs Reviewed and Ordered (1 total): None.    Medicine Tests Summarized and Ordered (EKG/ECHO/COLONOSCOPY/EGD) (1 total): None.    Independent Review of EKG or X-Ray (2 each): None.    The visit lasted a total of 45 minutes face to face with the patient. Over 50% of the time was spent counseling and educating the patient about plan of care.    Medications:  Current Outpatient Prescriptions   Medication Sig Dispense Refill     ALPRAZOLAM (XANAX ORAL) Take by mouth.       ALPRAZolam (XANAX) 0.25 MG tablet Take 1 tablet (0.25 mg total) by mouth 3 (three) times a day as needed for anxiety. 30 tablet 0     No current facility-administered  medications for this visit.        Total Data Points: 0    BLAINE Presley, CNP    This note has been dictated using voice recognition software. Any grammatical or context distortions are unintentional and inherent to the software

## 2021-06-10 NOTE — TELEPHONE ENCOUNTER
"Triage Call  Call from patient with concerns of anxiety and heart palpitations \"it feels like I have an extra heartbeat\"  Reports having increased stressors right now  Tearful at times on phone   Has had happen in the past when    Has been going on three days and states \"I think I just need a little help right now\"  Denies chest pain or pressure  Denies shortness of breath   Denies trouble sleeping but will wake up with fast heart rate  Feeling tense and shaky  Trouble concentrating  Denies having thoughts of hurting self  Reports going through a break-up and also an upcoming service for a friend who had passed a few months ago  \"I just think I need some help right now\"  Denies drinking caffeinated beverages  Denies drug use or alcohol  Denies feelings of sadness or hopelessness  Had prescription or Xanax in the past that was helpful \"I don't know if I need Xanax but something to help me get through this\" \"I don't like feeling weird but I need help\"     Disposition   See within 3 days in office per protocol. Patient prefers to do a virtual visit. Educated on care advice and discussed sleep. Exercise, eating a balanced diet and staying hydrated. Encouraged to call back if symptoms worsen or with questions. Transferred to scheduling.    Reason for Disposition    Patient wants to be seen    Additional Information    Negative: SEVERE difficulty breathing (e.g., struggling for each breath, speaks in single words)    Negative: Bluish (or gray) lips or face    Negative: Difficult to awaken or acting confused  (e.g., disoriented, slurred speech)    Negative: Hysterical or combative behavior    Negative: Sounds like a life-threatening emergency to the triager    Negative: Chest pain    Negative: Palpitations, skipped heart beat, or rapid heart beat    Negative: Cough is main symptom    Negative: Suicide thoughts, threats, attempts, or questions    Negative: Depression is main problem or symptom (e.g., feelings of " sadness or hopelessness)    Negative: Difficulty breathing and persists > 10 minutes and not relieved by reassurance provided by triager    Negative: Lightheadedness or dizziness and persists > 10 minutes and not relieved by reassurance provided by triager    Negative: Alcohol or drug abuse, known or suspected, and feeling very shaky (i.e., visible tremors of hands)    Negative: Patient sounds very sick or weak to the triager    Negative: Patient sounds very upset or troubled to the triager    Negative: Symptoms interfere with work or school    Negative: Symptoms of anxiety or panic and has not been evaluated for this by physician    Negative: Started on anti-anxiety medication and no relief    Negative: Significant weight loss (or gain) and not dieting    Negative: Taking thyroid medications    Negative: Caffeine abuse, known or suspected (e.g., > 2 cups of coffee/tea or > 4 cans of soda / day)    Negative: Alcohol or drug abuse, known or suspected    Negative: Taking herbal remedies    Negative: Recent traumatic event (e.g., death of a loved one, job loss, victim/witness of crime)    Negative: Requesting to talk to a counselor (e.g., mental health worker, psychiatrist)    Protocols used: ANXIETY AND PANIC ATTACK-A-OH    COVID 19 Nurse Triage Plan/Patient Instructions    Please be aware that novel coronavirus (COVID-19) may be circulating in the community. If you develop symptoms such as fever, cough, or SOB or if you have concerns about the presence of another infection including coronavirus (COVID-19), please contact your health care provider or visit [email or web address].     Disposition/Instructions    Virtual Visit with provider recommended. Reference Visit Selection Guide.    Thank you for taking steps to prevent the spread of this virus.  o Limit your contact with others.  o Wear a simple mask to cover your cough.  o Wash your hands well and often.    Resources    [facility]: About COVID-19:  [email or web address]    CDC: What to Do If You're Sick: www.cdc.gov/coronavirus/2019-ncov/about/steps-when-sick.html    CDC: Ending Home Isolation: www.cdc.gov/coronavirus/2019-ncov/hcp/disposition-in-home-patients.html     CDC: Caring for Someone: www.cdc.gov/coronavirus/2019-ncov/if-you-are-sick/care-for-someone.html     [facility]: Interim Guidance for Hospital Discharge to Home: www.[email or web address]./diseases/coronavirus/hcp/hospdischarge.pdf    [facility] clinical trials (COVID-19 research studies): [email or web address]-clinical-trials     Below are the COVID-19 hotlines at the [place] Department [facility] ([facility]). Interpreters are available.   o For health questions: Call 090-023-4518 or 1-868.336.1042 (7 a.m. to 7 p.m.)  o For questions about schools and childcare: Call 524-401-4817 or 1-282.745.4161 (7 a.m. to 7 p.m.)     Lizzy Joe RN [facility] 8/17/2020 2:58 PM

## 2021-06-10 NOTE — PROGRESS NOTES
"Elijah Jorgensen is a 59 y.o. female who is being evaluated via a billable telephone visit.      The patient has been notified of following:     \"This telephone visit will be conducted via a call between you and your physician/provider. We have found that certain health care needs can be provided without the need for a physical exam.  This service lets us provide the care you need with a short phone conversation.  If a prescription is necessary we can send it directly to your pharmacy.  If lab work is needed we can place an order for that and you can then stop by our lab to have the test done at a later time.    Telephone visits are billed at different rates depending on your insurance coverage. During this emergency period, for some insurers they may be billed the same as an in-person visit.  Please reach out to your insurance provider with any questions.    If during the course of the call the physician/provider feels a telephone visit is not appropriate, you will not be charged for this service.\"    Patient has given verbal consent to a Telephone visit? Yes    What phone number would you like to be contacted at? [phone]    Patient would like to receive their AVS by AVS Preference: E-Mail (Inform patient AVS not encrypted with this option).        HPI - 58 yo female   Since Friday having anxiety and getting worse over the last 3-4 days  Feeling shaky  Heart racing like extra heart beat  Had similar in the past (years ago) when   and took Xanax briefly   Boyfriend is moving out  Friend  5 months ago and services is coming up  Feeling very nervous  Trying to eat well, get sleep, drink water but its not helping  No psychiatry hospital  PHQ9 - 3  GAD7 = 11  No SI      Patient Active Problem List   Diagnosis     Generalized Anxiety Disorder     Current Outpatient Medications   Medication Sig     cetirizine (ZYRTEC) 10 MG tablet Take 10 mg by mouth daily.     fluticasone propionate (FLONASE) 50 " mcg/actuation nasal spray 2 sprays into each nostril daily.     pseudoephedrine (SUDAFED) 30 MG tablet Take 30 mg by mouth 2 (two) times a day.     HEMP OIL OR EXTRACT OR OTHER CBD CANNABINOID, NOT MEDICAL CANNABIS, CBD oil- takes prn           Assessment/Plan:  1. Generalized anxiety disorder  Acute anxiety in setting of history of ANUPAMA  She has a lot of personal issues going on and stressed about the world in general, and feeling overwhelmed.   Previously on xanax. Discussed the risks and addictive quality with xanax and it being a controlled substance that I don't prescribe often, especially with someone I have never met.   She is not interested in daily medications or SSRI.   She is not interested in therapy referral at this time.   Given rx for vistaril for anxiety  Encouraged online meditation /guided imagery for anxiety  Advised to f/u with PCP in a couple of weeks for close f/u  - hydrOXYzine pamoate (VISTARIL) 25 MG capsule; Take 1 capsule (25 mg total) by mouth 3 (three) times a day as needed for anxiety.  Dispense: 30 capsule; Refill: 0    Phone call duration:  13 minutes    Dr. Isabell Cisneros  8/17/2020

## 2021-06-11 NOTE — PROGRESS NOTES
Assessment & Plan:  1. Sore throat  Rapid strep positive.   - Rapid Strep A Screen-Throat    2. Strep pharyngitis  Treat as below. Follow up if not improving.   - penicillin VK (PEN VK) 500 MG tablet; Take 1 tablet (500 mg total) by mouth 3 (three) times a day for 10 days.  Dispense: 30 tablet; Refill: 0    3. Cough  Treat as below. Follow up if not improving.  - albuterol (VENTOLIN HFA) 90 mcg/actuation inhaler; Inhale 2 puffs every 6 (six) hours as needed.  Dispense: 1 Inhaler; Refill: 0      There are no Patient Instructions on file for this visit.    Orders Placed This Encounter   Procedures     Rapid Strep A Screen-Throat     Medications Discontinued During This Encounter   Medication Reason     ALPRAZOLAM (XANAX ORAL) Duplicate order         Chief Complaint:   Chief Complaint   Patient presents with     Sore Throat     since thurs/friday night; very sore throat last night       History of Present Illness:  Elijah is a 56 y.o. female presenting to the clinic today worsening sore throat since this past Thursday, 5 days ago.  Patient having a lot of swelling in the glands and feeling narrowed in the throat.  Breathing and swallowing is okay.  She has had a low-grade temperature.  She has had some body aches and chills.  No known exposure to illness..     Review of Systems:  All other systems are negative except as noted above.    PFSH:  Reviewed and updated.       Tobacco Use:  History   Smoking Status     Never Smoker   Smokeless Tobacco     Not on file       Vitals:  Vitals:    06/12/17 1526   BP: 118/70   Patient Site: Left Arm   Patient Position: Sitting   Cuff Size: Adult Large   Pulse: 76   Temp: 98.7  F (37.1  C)   TempSrc: Oral     Wt Readings from Last 3 Encounters:   No data found for Wt       Physical Exam:  Constitutional:  Reveals an alert, cooperative, 56 year old female in no acute distress.  Vitals:  Per nursing notes.  Eyes: PERRLA, funduscopic exam within normal limits.  HENT: TMs clear  bilaterally,Oropharynx with erythema, tonsils +2 bilaterally, no exudates., posterior nasal drainage or thrush. Neck supple, thyroid not palpable.  Cardiovascular:  Regular rate and rhythm without murmurs, rubs, or gallops. Carotids without bruits. Legs without edema.   Respiratory: Clear.  Respiratory effort normal.  Lymph: Moderate anterior cervical lymphadenopathy.  Tender to palpation.  Psychiatric:  Mood appropriate, memory intact.     Data Reviewed:  Additional History from Old Records or Another Person Summarized (2 total): None.     Decision to Obtain Extra information (1 total): None.     Radiology Tests Summarized and Ordered (XRAY/CT/MRI/DXA) (1 total): None.    Labs Reviewed and Ordered (1 total): Strep    Medicine Tests Summarized and Ordered (EKG/ECHO/COLONOSCOPY/EGD) (1 total): None.    Independent Review of EKG or X-Ray (2 each): None.    The visit lasted a total of 20 minutes face to face with the patient. Over 50% of the time was spent counseling and educating the patient about plan of care.    Medications:  Current Outpatient Prescriptions   Medication Sig Dispense Refill     ALPRAZolam (XANAX) 0.25 MG tablet Take 1 tablet (0.25 mg total) by mouth 3 (three) times a day as needed for anxiety. 30 tablet 0     albuterol (VENTOLIN HFA) 90 mcg/actuation inhaler Inhale 2 puffs every 6 (six) hours as needed. 1 Inhaler 0     penicillin VK (PEN VK) 500 MG tablet Take 1 tablet (500 mg total) by mouth 3 (three) times a day for 10 days. 30 tablet 0     No current facility-administered medications for this visit.        Total Data Points:     BLAINE Presley, CNP    This note has been dictated using voice recognition software. Any grammatical or context distortions are unintentional and inherent to the software

## 2021-06-12 NOTE — PROGRESS NOTES
"Elijah Jorgensen is a 60 y.o. female who is being evaluated via a billable telephone visit.      The patient has been notified of following:     \"This telephone visit will be conducted via a call between you and your physician/provider. We have found that certain health care needs can be provided without the need for a physical exam.  This service lets us provide the care you need with a short phone conversation.  If a prescription is necessary we can send it directly to your pharmacy.  If lab work is needed we can place an order for that and you can then stop by our lab to have the test done at a later time.    Telephone visits are billed at different rates depending on your insurance coverage. During this emergency period, for some insurers they may be billed the same as an in-person visit.  Please reach out to your insurance provider with any questions.    If during the course of the call the physician/provider feels a telephone visit is not appropriate, you will not be charged for this service.\"    Patient has given verbal consent to a Telephone visit? Yes    What phone number would you like to be contacted at? [phone]    Patient would like to receive their AVS by AVS Preference: Mail a copy.    Yesterday started to \"not feel well\".  But last night started to develop a subjective fever. Describes symptoms specifically cough, body aches, congested. Some watery eyes and itchy throat. She took a mucinex cold and flu today which helped. Some tylenol as well which helped.     Assessment/Plan:  1. Acute non-recurrent sinusitis, unspecified location  2. Suspected COVID-19 virus infection  Suspect COVID given symptoms but patient insists she had a true fever and describes symptoms of facial pain. Possibly sinusitis as well. Advised patient to have covid testing and if negative then  antibiotic prescription for treatment of sinusitis. If covid positive advised not to  prescription. Discussed if COvid " positive to continue isolating with symptomatic treatment. Discussed circumstances to go to the ER or further workup and treatment.     - amoxicillin-clavulanate (AUGMENTIN) 875-125 mg per tablet; Take 1 tablet by mouth 2 (two) times a day for 7 days.  Dispense: 14 tablet; Refill: 0  - Symptomatic COVID-19 Virus (CORONAVIRUS) PCR; Future      Start time: 2:50, end time: 3:05  Phone call duration: 15  minutes    Stephon Carter MD

## 2021-06-12 NOTE — TELEPHONE ENCOUNTER
Elijah tried to go one line on oncare.  Elijah has a stuffy nose and a cough and has a fever.  Symptoms started yesterday and is staying hydrated.  Body aches and fatigue are present.      COVID 19 Nurse Triage Plan/Patient Instructions    Please be aware that novel coronavirus (COVID-19) may be circulating in the community. If you develop symptoms such as fever, cough, or SOB or if you have concerns about the presence of another infection including coronavirus (COVID-19), please contact your health care provider or visit [email or web address].     Disposition/Instructions    Virtual Visit with provider recommended. Reference Visit Selection Guide.    Thank you for taking steps to prevent the spread of this virus.  o Limit your contact with others.  o Wear a simple mask to cover your cough.  o Wash your hands well and often.    Resources    [facility]: About COVID-19: [email or web address]    CDC: What to Do If You're Sick: www.cdc.gov/coronavirus/2019-ncov/about/steps-when-sick.html    CDC: Ending Home Isolation: www.cdc.gov/coronavirus/2019-ncov/hcp/disposition-in-home-patients.html     CDC: Caring for Someone: www.cdc.gov/coronavirus/2019-ncov/if-you-are-sick/care-for-someone.html     [facility]: Interim Guidance for Hospital Discharge to Home: www.health.[facility].[place]/diseases/coronavirus/hcp/hospdischarge.pdf    [facility] clinical trials (COVID-19 research studies): [email or web address]-clinical-trials     Below are the COVID-19 hotlines at the [facility] of Health ([facility]). Interpreters are available.   o For health questions: Call 456-386-8978 or 1-787.535.7529 (7 a.m. to 7 p.m.)  o For questions about schools and childcare: Call 124-206-5857 or 1-494.866.3433 (7 a.m. to 7 p.m.)       Reason for Disposition    [1] COVID-19 infection suspected by caller or triager AND [2] mild symptoms (cough, fever, or others) AND [3] no complications or SOB    Additional Information    Negative:  SEVERE difficulty breathing (e.g., struggling for each breath, speaks in single words)    Negative: Difficult to awaken or acting confused (e.g., disoriented, slurred speech)    Negative: Bluish (or gray) lips or face now    Negative: Shock suspected (e.g., cold/pale/clammy skin, too weak to stand, low BP, rapid pulse)    Negative: Sounds like a life-threatening emergency to the triager    Negative: SEVERE or constant chest pain or pressure (Exception: mild central chest pain, present only when coughing)    Negative: MODERATE difficulty breathing (e.g., speaks in phrases, SOB even at rest, pulse 100-120)    Negative: Patient sounds very sick or weak to the triager    Negative: MILD difficulty breathing (e.g., minimal/no SOB at rest, SOB with walking, pulse <100)    Negative: Chest pain or pressure    Negative: Fever > 103 F (39.4 C)    Negative: [1] Fever > 101 F (38.3 C) AND [2] age > 60    Negative: [1] Fever > 100.0 F (37.8 C) AND [2] bedridden (e.g., nursing home patient, CVA, chronic illness, recovering from surgery)    Negative: HIGH RISK patient (e.g., age > 64 years, diabetes, heart or lung disease, weak immune system) (Exception: Has already been evaluated by healthcare provider and has no new or worsening symptoms)    Negative: Fever present > 3 days (72 hours)    Negative: [1] Fever returns after gone for over 24 hours AND [2] symptoms worse or not improved    Negative: [1] Continuous (nonstop) coughing interferes with work or school AND [2] no improvement using cough treatment per protocol    Protocols used: CORONAVIRUS (COVID-19) DIAGNOSED OR [id number] 8.4.20

## 2021-06-18 NOTE — PROGRESS NOTES
ASSESSMENT/PLAN  1. Colon cancer screening  Patient interested in genetic screening  We will place order  - Cologuard    2. Anxiety  Long discussion with the patient in regards to starting a controller medication for her anxiety patient is quite resistant towards trying other medications but discussed with her it be a better choice long-term for her  We are start sertraline 25 mg daily  Have her follow back up in 1 month  Contact me sooner if any problems with medication  Goals to decrease use of alprazolam which has been increasing    3. Controlled substance agreement signed  Controlled substance agreement signed and discussed today        SUBJECTIVE:   Chief Complaint   Patient presents with     Medication Management     Medication Refill     Elijah Jorgensen 57 y.o. female    Current Outpatient Prescriptions   Medication Sig Dispense Refill     ALPRAZolam (XANAX) 0.25 MG tablet Take 1 tablet (0.25 mg total) by mouth 3 (three) times a day as needed for anxiety. 30 tablet 0     sertraline (ZOLOFT) 25 MG tablet Take 1 tablet (25 mg total) by mouth daily. 30 tablet 2     No current facility-administered medications for this visit.      Allergies: Erythromycin base and Sulfa (sulfonamide antibiotics)   No LMP recorded. Patient is postmenopausal.    HPI:   Patient was asked to come in for medication follow-up  Patient is seen by one my partners at clinic  Patient has been taking alprazolam-discussed with her increasing use and she explained how she has had to take more uncertain occasions and we discussed tolerance  Discussed with her starting a controlling medication with SSRI which patient is very hesitant about  She is worried about the multitude of possible side effects that could occur which we discussed  Discussed with her the medication that she is taking also has large possibility of side effects as well  After discussion patient is willing to start an SSRI  We will start 25 mg of sertraline daily  Have her  "follow back up with us in 1 month  Contact me sooner if any complications  Hopefully see titration down the use of alprazolam as sertraline takes and to affect    ROS: negative except as per HPI    OBJECTIVE:   The patient appears well, alert, oriented x 3, in no distress.  /68 (Patient Site: Right Arm, Patient Position: Sitting, Cuff Size: Adult Regular)  Pulse 72  Resp 16  Ht 5' 5\" (1.651 m)    Lungs: clear, good air entry, no wheezes, rhonchi or rales.   Cardiac: S1 and S2 normal, no murmurs, regular rate and rhythm.   Abdomen: normal bowel sounds, soft  Extremities: show no edema, normal peripheral pulses.   Neurological: normal, no focal findings.  Skin: clear, dry, no rashes/lesions  Psych-anxious and tearful at times      Pt states an understanding and agrees to the above plan.  Greater than 25 minutes was spent today in interview and examination with Elijah Jorgensen with more than 50% of that time in counseling and coordination of care.    "

## 2021-06-20 NOTE — LETTER
Letter by Enrique Tipton MD at      Author: Enrique Tipton MD Service: -- Author Type: --    Filed:  Encounter Date: 1/22/2020 Status: (Other)           January 22, 2020        Elijah RADHA Joslyn  [address]  [place] [address]        Dear Elijah,    Breast cancer is the most common type of cancer among American women. The earlier breast cancer is detected, the better the survival rate. Your best defense against breast cancer is having a screening mammogram on a regular basis. The American Cancer Society recommends that women with an average risk of breast cancer should undergo regular screening mammography starting at age 45 years.         Women aged 45 to 54 years should have a mammogram annually.     Women 55 years and older should have a mammogram at least every other year.     There may be important reasons for you to follow a more frequent screening plan or an earlier start for breast cancer screening, so please discuss your health history and your family health history with your primary care provider.       We reviewed our records and we do not see that you had a mammogram within the past two years. If you have not had a mammogram in the past two years, we strongly encourage you to call for your appointment today. For your convenience, we have included a phone number below for you to schedule your mammogram at a nearby [facility] location.      If you have had a recent mammogram or have questions about why you are receiving this letter, please contact your primary care clinic at [phone] or send a trustedsafe message  ([email or web address]). Your clinic will answer any questions or help obtain recent mammogram reports for your chart at [facility] so we can keep record of your preventive care.       Sincerely,    Maggie Logan FNP    and your primary care team at [facility]  Schedule your mammogram today at one of our 7 locations  Please call   [phone]

## 2021-06-27 ENCOUNTER — HEALTH MAINTENANCE LETTER (OUTPATIENT)
Age: 61
End: 2021-06-27

## 2021-10-17 ENCOUNTER — HEALTH MAINTENANCE LETTER (OUTPATIENT)
Age: 61
End: 2021-10-17

## 2022-01-28 ENCOUNTER — NURSE TRIAGE (OUTPATIENT)
Dept: NURSING | Facility: CLINIC | Age: 62
End: 2022-01-28

## 2022-01-28 ENCOUNTER — VIRTUAL VISIT (OUTPATIENT)
Dept: INTERNAL MEDICINE | Facility: CLINIC | Age: 62
End: 2022-01-28
Payer: COMMERCIAL

## 2022-01-28 DIAGNOSIS — J01.90 ACUTE SINUSITIS WITH SYMPTOMS > 10 DAYS: Primary | ICD-10-CM

## 2022-01-28 PROCEDURE — 99213 OFFICE O/P EST LOW 20 MIN: CPT | Mod: 95 | Performed by: INTERNAL MEDICINE

## 2022-01-28 RX ORDER — AZITHROMYCIN 250 MG/1
TABLET, FILM COATED ORAL
Qty: 6 TABLET | Refills: 0 | Status: SHIPPED | OUTPATIENT
Start: 2022-01-28

## 2022-01-28 NOTE — TELEPHONE ENCOUNTER
Pt called in states she has sinus pain and congestion.  The Pt has pain on her forehead and eye braw area.  Pt has congestion.  The Pt has runny nose some time.  The pain is mild.  The Pt has sinus before.  No fever.  No sore throat, no cough.  Has head congestion.  Has little earache.  No difficulty breathing.  Pt is not pregnant.  The disposition is to be seen today.  Appointment is made for the Pt.  Care advice given per protocol.  Patient agrees with care advice given.   Agreed to call back if he has additional symptoms or questions.      Attila Lizarraga [place] Nurse Advisor 1/28/2022 3:31 PM          Additional Information    Negative: Sounds like a life-threatening emergency to the triager    Negative: Difficulty breathing, and not from stuffy nose (e.g., not relieved by cleaning out the nose)    Negative: SEVERE headache and has fever    Negative: Patient sounds very sick or weak to the triager    Negative: SEVERE sinus pain    Negative: Severe headache    Negative: Redness or swelling on the cheek, forehead, or around the eye    Negative: Fever > 103 F (39.4 C)    Negative: Fever > 101 F (38.3 C) and over 60 years of age    Negative: Fever > 100.0 F (37.8 C) and has diabetes mellitus or a weak immune system (e.g., HIV positive, cancer chemotherapy, organ transplant, splenectomy, chronic steroids)    Negative: Fever > 100.0 F (37.8 C) and bedridden (e.g., nursing home patient, stroke, chronic illness, recovering from surgery)    Negative: Fever present > 3 days (72 hours)    Negative: Fever returns after gone for over 24 hours and symptoms worse or not improved    Negative: Sinus pain (not just congestion) and fever    Earache    Protocols used: SINUS PAIN AND CONGESTION-A-OH

## 2022-01-28 NOTE — PROGRESS NOTES
Elijah is a 61 year old who is being evaluated via a billable   Telephone visit.      How would you like to obtain your AVS? Mail a copy  If the video visit is dropped, the invitation should be resent by: Text to cell phone: [phone]  Will anyone else be joining your video visit? No     Start Time: 4:57 PM    Assessment & Plan     Acute sinusitis with symptoms > 10 days     - Symptomatic; Yes; 1/21/2022 COVID-19 Virus (Coronavirus) by PCR Nose; Future  - azithromycin (ZITHROMAX) 250 MG tablet; Take two tabs the first day, then one daily for the next four days      I spent a total of 20 minutes on the day of the visit.   Time spent doing chart review, history and exam, documentation and further activities per the note             No follow-ups on file.    Josephine Go MD  [facility]    Nichelle Nava is a 61 year old who presents for the following health issues  accompanied by her self.    HPI     Concern - Possible sinus infection2  Onset: 2 days  Description: Congestion, runny nose, earache, dizziness, headache  Intensity: mild  Progression of Symptoms:  worsening  Accompanying Signs & Symptoms: congestion, runny nose, earache, dizziness and headache  Previous history of similar problem: yes  Precipitating factors:        Worsened by: nothing  Alleviating factors:        Improved by: taking Suefed  Therapies tried and outcome: Suefed a little Releaf.       Tried sudafed, nose and cheeks are sore  Symptoms for a week  Started with sneezing and runny nose  She lives alone.    Daughter had similar symptoms a week ago:  A zpack helped her.        Review of Systems   Constitutional, HEENT, cardiovascular, pulmonary, gi and gu systems are negative, except as otherwise noted.      Objective           Vitals:  No vitals were obtained today due to virtual visit.    Physical Exam   GENERAL: Healthy, alert and no distress  RESP: No audible wheeze, cough, or visible cyanosis.  No visible  retractions or increased work of breathing.    PSYCH: Mentation appears normal, affect normal/bright, judgement and insight intact, normal speech and appearance well-groomed.                 Video-Visit Details    Type of service:   Telephone Visit    Video End Time:5:04 PM    Originating Location (pt. Location): Home    Distant Location (provider location):  [facility]     Platform used fort: Other: this was done as telephone visit

## 2022-07-24 ENCOUNTER — HEALTH MAINTENANCE LETTER (OUTPATIENT)
Age: 62
End: 2022-07-24

## 2022-10-02 ENCOUNTER — HEALTH MAINTENANCE LETTER (OUTPATIENT)
Age: 62
End: 2022-10-02

## 2023-08-12 ENCOUNTER — HEALTH MAINTENANCE LETTER (OUTPATIENT)
Age: 63
End: 2023-08-12

## 2023-11-09 DIAGNOSIS — Z12.11 COLON CANCER SCREENING: ICD-10-CM

## 2023-11-23 ENCOUNTER — LAB (OUTPATIENT)
Dept: FAMILY MEDICINE | Facility: CLINIC | Age: 63
End: 2023-11-23
Payer: COMMERCIAL

## 2023-11-23 DIAGNOSIS — Z12.11 COLON CANCER SCREENING: ICD-10-CM

## 2024-01-24 ENCOUNTER — NURSE TRIAGE (OUTPATIENT)
Dept: NURSING | Facility: CLINIC | Age: 64
End: 2024-01-24
Payer: COMMERCIAL

## 2024-01-24 NOTE — TELEPHONE ENCOUNTER
Nurse Triage SBAR    Is this a 2nd Level Triage? NO    Situation:  Patient calling reporting she has nasal congestion and is scheduled to baby sit her  grandchild.    Background:     Assessment:   Patient is currently on Amoxicillin for dental symptoms x 2 weeks.  Expressing concern that the Amoxicillin will make her symptoms worse.  Reviewed if symptoms are viral Amoxicillin would not treat congestion.   Reviewed if wanting to stop antibiotic to speak with dentist on ending early.  Afebrile.  Denies cough, sore throat or other symptoms.  Symptoms of congestion only.  Stating she has not taken the home COVID 19 testing.  Patient is questioning if she is contagious?  Encouraged home COVID 19 testing. Further triage declined from patient.   Reviewed she would be considered contagious and  is considered higher risk.      Protocol Recommended Disposition:   Triage incomplete. Patient declines further triage. Encouraged COVID 19 testing.    Additional Information   Negative: Not alert when awake (true lethargy)   Negative: SEVERE difficulty breathing (e.g., struggling for each breath, speaks in single words)   Negative: Very weak (can't stand)   Negative: Sounds like a life-threatening emergency to the triager   Negative: Patient sounds very sick or weak to the triager   Negative: Fever > 103 F (39.4 C)   Negative: Fever > 101 F (38.3 C) and over 60 years of age   Negative: Fever > 100.0 F (37.8 C) and has diabetes mellitus or a weak immune system (e.g., HIV positive, cancer chemotherapy, organ transplant, splenectomy, chronic steroids)   Negative: Fever > 100.0 F (37.8 C) and bedridden (e.g., CVA, chronic illness, recovering from surgery)   Negative: Fever present > 3 days (72 hours)   Negative: Fever returns after gone for over 24 hours and symptoms worse or not improved   Negative: Sinus pain (not just congestion) and fever    Protocols used: Colds-P-OH, Common Cold-A-OH

## 2024-07-01 ENCOUNTER — TRANSFERRED RECORDS (OUTPATIENT)
Dept: HEALTH INFORMATION MANAGEMENT | Facility: CLINIC | Age: 64
End: 2024-07-01
Payer: COMMERCIAL

## 2024-10-05 ENCOUNTER — HEALTH MAINTENANCE LETTER (OUTPATIENT)
Age: 64
End: 2024-10-05